# Patient Record
Sex: MALE | Race: WHITE
[De-identification: names, ages, dates, MRNs, and addresses within clinical notes are randomized per-mention and may not be internally consistent; named-entity substitution may affect disease eponyms.]

---

## 2018-04-10 ENCOUNTER — HOSPITAL ENCOUNTER (EMERGENCY)
Dept: HOSPITAL 74 - JER | Age: 48
Discharge: HOME | End: 2018-04-10
Payer: COMMERCIAL

## 2018-04-10 VITALS — TEMPERATURE: 98.3 F | SYSTOLIC BLOOD PRESSURE: 140 MMHG | HEART RATE: 80 BPM | DIASTOLIC BLOOD PRESSURE: 80 MMHG

## 2018-04-10 VITALS — BODY MASS INDEX: 37.7 KG/M2

## 2018-04-10 DIAGNOSIS — I50.9: ICD-10-CM

## 2018-04-10 DIAGNOSIS — I10: ICD-10-CM

## 2018-04-10 DIAGNOSIS — F32.9: ICD-10-CM

## 2018-04-10 DIAGNOSIS — R60.0: Primary | ICD-10-CM

## 2018-04-10 DIAGNOSIS — K21.9: ICD-10-CM

## 2018-04-10 LAB
ALBUMIN SERPL-MCNC: 4 G/DL (ref 3.4–5)
ALP SERPL-CCNC: 64 U/L (ref 45–117)
ALT SERPL-CCNC: 53 U/L (ref 12–78)
ANION GAP SERPL CALC-SCNC: 9 MMOL/L (ref 8–16)
AST SERPL-CCNC: 37 U/L (ref 15–37)
BASOPHILS # BLD: 0.9 % (ref 0–2)
BILIRUB SERPL-MCNC: 0.8 MG/DL (ref 0.2–1)
BNP SERPL-MCNC: 23.12 PG/ML (ref 5–125)
BUN SERPL-MCNC: 18 MG/DL (ref 7–18)
CALCIUM SERPL-MCNC: 8.9 MG/DL (ref 8.5–10.1)
CHLORIDE SERPL-SCNC: 103 MMOL/L (ref 98–107)
CO2 SERPL-SCNC: 25 MMOL/L (ref 21–32)
CREAT SERPL-MCNC: 1.4 MG/DL (ref 0.7–1.3)
DEPRECATED RDW RBC AUTO: 16.2 % (ref 11.9–15.9)
EOSINOPHIL # BLD: 3.3 % (ref 0–4.5)
GLUCOSE SERPL-MCNC: 108 MG/DL (ref 74–106)
HCT VFR BLD CALC: 44.9 % (ref 35.4–49)
HGB BLD-MCNC: 15 GM/DL (ref 11.7–16.9)
INR BLD: 0.99 (ref 0.82–1.09)
LYMPHOCYTES # BLD: 26.9 % (ref 8–40)
MAGNESIUM SERPL-MCNC: 1.8 MG/DL (ref 1.8–2.4)
MCH RBC QN AUTO: 27.5 PG (ref 25.7–33.7)
MCHC RBC AUTO-ENTMCNC: 33.4 G/DL (ref 32–35.9)
MCV RBC: 82.4 FL (ref 80–96)
MONOCYTES # BLD AUTO: 8.9 % (ref 3.8–10.2)
NEUTROPHILS # BLD: 60 % (ref 42.8–82.8)
PLATELET # BLD AUTO: 273 K/MM3 (ref 134–434)
PMV BLD: 8.9 FL (ref 7.5–11.1)
POTASSIUM SERPLBLD-SCNC: 4.1 MMOL/L (ref 3.5–5.1)
PROT SERPL-MCNC: 8.1 G/DL (ref 6.4–8.2)
PT PNL PPP: 11.2 SEC (ref 9.98–11.88)
RBC # BLD AUTO: 5.45 M/MM3 (ref 4–5.6)
SODIUM SERPL-SCNC: 137 MMOL/L (ref 136–145)
WBC # BLD AUTO: 9.3 K/MM3 (ref 4–10)

## 2018-04-10 NOTE — PDOC
Rapid Medical Evaluation


Chief Complaint: Edema


Time Seen by Provider: 04/10/18 17:24


Medical Evaluation: 


 Allergies











Allergy/AdvReac Type Severity Reaction Status Date / Time


 


No Known Allergies Allergy   Verified 03/07/14 17:46











04/10/18 17:24


I have performed a brief in-person evaluation of this patient. 


The patient presents with a chief complaint of: hx of HTN, legs swelling, 

shortness of breath x 2 weeks


Pertinent physical exam findings: lungs ctab


I have ordered the following: ekg, labs, cxr


The patient will proceed to the ED for further evaluation.





**Discharge Disposition





- Diagnosis


 SOB (shortness of breath)








- Referrals





- Patient Instructions





- Post Discharge Activity

## 2018-04-10 NOTE — PDOC
*Physical Exam





- Vital Signs


 Last Vital Signs











Temp Pulse Resp BP Pulse Ox


 


 98.3 F   80   19   140/80   95 


 


 04/10/18 17:23  04/10/18 17:23  04/10/18 17:23  04/10/18 17:23  04/10/18 17:23














ED Treatment Course





- LABORATORY


CBC & Chemistry Diagram: 


 04/10/18 19:08





 04/10/18 19:08





- ADDITIONAL ORDERS


Additional order review: 


 Laboratory  Results











  04/10/18 04/10/18 04/10/18





  19:08 19:08 19:08


 


PT with INR    11.20


 


INR    0.99


 


Sodium   137 


 


Potassium   4.1 


 


Chloride   103 


 


Carbon Dioxide   25 


 


Anion Gap   9 


 


BUN   18 


 


Creatinine   1.4 H 


 


Creat Clearance w eGFR   54.09 


 


Random Glucose   108 H 


 


Calcium   8.9 


 


Magnesium  1.8  


 


Total Bilirubin   0.8  D 


 


AST   37 


 


ALT   53  D 


 


Alkaline Phosphatase   64 


 


Creatine Kinase   311 H 


 


Creatine Kinase Index   0.8 


 


CK-MB (CK-2)   2.677 


 


Troponin I   < 0.02 


 


B-Natriuretic Peptide  23.12  


 


Total Protein   8.1 


 


Albumin   4.0 








 











  04/10/18





  19:08


 


RBC  5.45


 


MCV  82.4


 


MCHC  33.4


 


RDW  16.2 H D


 


MPV  8.9


 


Neutrophils %  60.0  D


 


Lymphocytes %  26.9  D


 


Monocytes %  8.9


 


Eosinophils %  3.3  D


 


Basophils %  0.9














*DC/Admit/Observation/Transfer


Diagnosis at time of Disposition: 


 SOB (shortness of breath), Patient left after triage








- Discharge Dispostion


Disposition: ELOPED





- Referrals


Referrals: 


Deion Jose MD [Primary Care Provider] - 





- Patient Instructions





- Post Discharge Activity

## 2018-04-10 NOTE — PDOC
History of Present Illness





- General


Chief Complaint: Edema


Stated Complaint: PAIN/EDEMA TO LOWER EXTREMITIES


Time Seen by Provider: 04/10/18 17:24


History Source: Patient


Exam Limitations: No Limitations





- History of Present Illness


Initial Comments: 





CHIEF COMPLAINT:  47 y/o afebrile male with PMH HTN and CHF c/o swollen legs x 

3 days. 





HISTORY OF PRESENT ILLNESS:  He states he takes his HTN/diuretic combo drug 

only when his legs swell.  He states for the past 3 days he's been taking it 

with little improvement in his swelling.  He denies HA, dizziness, cough, 

orthopnea, CP, SOB, abd pain, n/v/d.





PCP is Dr. Jose.





Vital signs on arrival are notable for O2 sat of 95% on RA





REVIEW OF SYSTEMS:


GENERAL/CONSTITUTIONAL: No fever/chills. No weakness. No weight change.


HEAD, EYES, EARS, NOSE AND THROAT: No change in vision. No ear pain or 

discharge. No sore throat.


CARDIOVASCULAR: No chest pain or shortness of breath.


RESPIRATORY: No cough, wheezing, or hemoptysis.


GASTROINTESTINAL: No abd pain, nausea, vomiting, diarrhea. 


GENITOURINARY: No dysuria, frequency, or change in urination.


MUSCULOSKELETAL: +LE swelling. No neck or back pain.


SKIN: No rash or easy bruising.


NEUROLOGIC: No headache, vertigo, loss of consciousness, or loss of sensation.





PHYSICAL EXAM:


GENERAL: The patient is awake, alert, and fully oriented, in no acute distress.

  He is well appearing.   He is lying flat in the ER without difficulty.


HEAD: Normal with no signs of trauma.


ENT: Pupils equal, round and reactive to light, extraocular movements intact, 

sclera anicteric, conjunctiva clear. Neck supple.


LUNGS: Clear to auscultation bilaterally. Normal excursion. No respiratory 

distress or use of accessory muscles.


CV: RRR, S1/S2, no MRG. Cap refill < 2 sec.


ABDOMEN: Soft, non-distended, non-tender even to deep palpation, no 

hepatomegaly or splenomegaly, no masses.


EXTREMITIES: Normal range of motion. 1+ pitting edema b/l LEs.


NEUROLOGICAL: Normal speech, normal gait. CN II-XII grossly intact.


PSYCH: Normal mood, normal affect.


SKIN: Warm, dry, normal turgor, no rashes or lesions noted.














Past History





- Past Medical History


Allergies/Adverse Reactions: 


 Allergies











Allergy/AdvReac Type Severity Reaction Status Date / Time


 


No Known Allergies Allergy   Verified 04/10/18 17:27











Home Medications: 


Ambulatory Orders





Citalopram Hydrobromide [Celexa -] 20 mg PO DAILY 03/07/14 


Esomeprazole Mag Trihydrate [Nexium] 40 mg PO DAILY 03/07/14 


Amlodipine Besylate 5 mg PO ASDIR 04/10/18 


Azilsartan Med/Chlorthalidone [Edarbyclor 40-12.5 mg Tablet] 1 each PO ASDIR 04/

10/18 








Cardiac Disorders: Yes (poss CHF?)


COPD: No


GI Disorders: Yes (ACID REFLUX)


Psychiatric Problems: Yes (DEPRESSION,)





- Suicide/Smoking/Psychosocial Hx


Smoking History: Never smoked


Information on smoking cessation initiated: No


Hx Alcohol Use: No


Drug/Substance Use Hx: No


Substance Use Type: None





*Physical Exam





- Vital Signs


 Last Vital Signs











Temp Pulse Resp BP Pulse Ox


 


 98.3 F   80   19   140/80   95 


 


 04/10/18 17:23  04/10/18 17:23  04/10/18 17:23  04/10/18 17:23  04/10/18 17:23














**Heart Score/ECG Review





- ECG Intrepretation


Comment:: 





Twelve-lead EKG was performed and reviewed by Dr. Winkler. There is normal sinus 

rhythm with a normal rate. The axis is normal. The intervals are normal. There 

are no ST or T wave abnormalities.





Impression: Normal twelve-lead EKG








ED Treatment Course





- LABORATORY


CBC & Chemistry Diagram: 


 04/10/18 19:08





 04/10/18 19:08





- ADDITIONAL ORDERS


Additional order review: 


 Laboratory  Results











  04/10/18 04/10/18 04/10/18





  19:08 19:08 19:08


 


PT with INR    11.20


 


INR    0.99


 


Sodium   137 


 


Potassium   4.1 


 


Chloride   103 


 


Carbon Dioxide   25 


 


Anion Gap   9 


 


BUN   18 


 


Creatinine   1.4 H 


 


Creat Clearance w eGFR   54.09 


 


Random Glucose   108 H 


 


Calcium   8.9 


 


Magnesium  1.8  


 


Total Bilirubin   0.8  D 


 


AST   37 


 


ALT   53  D 


 


Alkaline Phosphatase   64 


 


Creatine Kinase   311 H 


 


Creatine Kinase Index   0.8 


 


CK-MB (CK-2)   2.677 


 


Troponin I   < 0.02 


 


B-Natriuretic Peptide  23.12  


 


Total Protein   8.1 


 


Albumin   4.0 








 











  04/10/18





  19:08


 


RBC  5.45


 


MCV  82.4


 


MCHC  33.4


 


RDW  16.2 H D


 


MPV  8.9


 


Neutrophils %  60.0  D


 


Lymphocytes %  26.9  D


 


Monocytes %  8.9


 


Eosinophils %  3.3  D


 


Basophils %  0.9














Medical Decision Making





- Medical Decision Making





A/P:  47 y/o male with LE swelling x 3 days.  BMP, cardiac labs, EKG, CXR 

normal.  Patient with no difficulty breathing.  Went over all labs with him.  

He has an appointment with Dr. Jose tomorrow.  Instructed him to return 

to the ER with any worsening or concerning symptoms. 





The patient verbalizes understanding of all instructions, has no further 

questions and is awaiting discharge.








*DC/Admit/Observation/Transfer


Diagnosis at time of Disposition: 


Edema


Qualifiers:


 Edema type: unspecified Qualified Code(s): R60.9 - Edema, unspecified








- Discharge Dispostion


Disposition: HOME


Condition at time of disposition: Good





- Referrals


Referrals: 


Deion Jose MD [Primary Care Provider] - 





- Patient Instructions


Printed Discharge Instructions:  DI for Peripheral Edema -- Bilateral


Additional Instructions: 


Discharge Instructions:


-Your EKG, CXR, BNP and troponin was normal


-Please follow up with Dr. Jose in the morning


-Please return to the ER with any worsening or concerning symptoms





- Post Discharge Activity

## 2018-04-11 NOTE — EKG
Test Reason : 

Blood Pressure : ***/*** mmHG

Vent. Rate : 072 BPM     Atrial Rate : 072 BPM

   P-R Int : 166 ms          QRS Dur : 100 ms

    QT Int : 370 ms       P-R-T Axes : 036 067 016 degrees

   QTc Int : 405 ms

 

NORMAL SINUS RHYTHM

NORMAL ECG

WHEN COMPARED WITH ECG OF 07-MAR-2014 20:27,

NO SIGNIFICANT CHANGE WAS FOUND

Confirmed by EDEN ESPINOSA MD ([id number]) on 4/11/2018 1:39:29 PM

 

Referred By:             Confirmed By:EDEN ESPINOSA MD